# Patient Record
(demographics unavailable — no encounter records)

---

## 2025-04-22 NOTE — REASON FOR VISIT
[FreeTextEntry2] :  04/22/2025:  13 year old male here today for: f/u fx care L wrist pain and closed torus fracture of distal end of left radius

## 2025-04-22 NOTE — HISTORY OF PRESENT ILLNESS
[2] : 2 [0] : 0 [Dull/Aching] : dull/aching [de-identified] : L DR fracture 3 weeks He is better  [FreeTextEntry1] : L wrist

## 2025-04-22 NOTE — ASSESSMENT
[FreeTextEntry1] : Light activities and advance as tolerated I recommend the patient take over the counter medication such as Advil/Motrin/Aleve or Tylenol for pain and inflammation Return prn

## 2025-04-22 NOTE — PHYSICAL EXAM
[de-identified] : L wrist Nontender DR Nontender scaphoid Improved ROM Min swelling  Todays Xrays PA/Lateral/Oblique of the L wrist shows healed fx